# Patient Record
Sex: FEMALE | Race: WHITE | NOT HISPANIC OR LATINO | Employment: STUDENT | URBAN - METROPOLITAN AREA
[De-identification: names, ages, dates, MRNs, and addresses within clinical notes are randomized per-mention and may not be internally consistent; named-entity substitution may affect disease eponyms.]

---

## 2020-04-21 ENCOUNTER — HOSPITAL ENCOUNTER (OUTPATIENT)
Dept: RADIOLOGY | Facility: HOSPITAL | Age: 20
Discharge: HOME | End: 2020-04-21
Attending: INTERNAL MEDICINE
Payer: COMMERCIAL

## 2020-04-21 DIAGNOSIS — M79.606 PAIN OF LOWER EXTREMITY, UNSPECIFIED LATERALITY: ICD-10-CM

## 2020-04-21 DIAGNOSIS — M25.562 ACUTE PAIN OF BOTH KNEES: ICD-10-CM

## 2020-04-21 DIAGNOSIS — M25.561 ACUTE PAIN OF BOTH KNEES: ICD-10-CM

## 2020-04-30 ENCOUNTER — HOSPITAL ENCOUNTER (OUTPATIENT)
Dept: RADIOLOGY | Facility: HOSPITAL | Age: 20
Discharge: HOME | End: 2020-04-30
Attending: INTERNAL MEDICINE
Payer: COMMERCIAL

## 2020-04-30 DIAGNOSIS — M79.606 PAIN OF LOWER EXTREMITY, UNSPECIFIED LATERALITY: ICD-10-CM

## 2020-04-30 DIAGNOSIS — M25.561 ACUTE PAIN OF BOTH KNEES: ICD-10-CM

## 2020-04-30 DIAGNOSIS — M25.562 ACUTE PAIN OF BOTH KNEES: ICD-10-CM

## 2020-04-30 PROCEDURE — 93970 US LOWER EXTREMITY VEINS BILATERAL: ICD-10-PCS | Mod: 26,,, | Performed by: RADIOLOGY

## 2020-04-30 PROCEDURE — 93970 EXTREMITY STUDY: CPT | Mod: 26,,, | Performed by: RADIOLOGY

## 2020-04-30 PROCEDURE — 93970 EXTREMITY STUDY: CPT | Mod: TC

## 2020-05-27 DIAGNOSIS — M25.561 PAIN IN BOTH KNEES, UNSPECIFIED CHRONICITY: Primary | ICD-10-CM

## 2020-05-27 DIAGNOSIS — M25.562 PAIN IN BOTH KNEES, UNSPECIFIED CHRONICITY: Primary | ICD-10-CM

## 2020-06-02 ENCOUNTER — TELEPHONE (OUTPATIENT)
Dept: ORTHOPEDICS | Facility: CLINIC | Age: 20
End: 2020-06-02

## 2020-06-02 NOTE — TELEPHONE ENCOUNTER
Returned the call to the patient regarding a new appointment for the one that was missed on 06/02/2020. There was no answer at given number. A voicemail was left informing the patient that we have her scheduled for 06/11/2020 here in [place] at 10:45 for xray's and 11 for Dr. Ward. Advised that if this does not work for the patient that she can call [phone] to reschedule a more fitting time.

## 2020-06-02 NOTE — TELEPHONE ENCOUNTER
----- Message from Coretta Alarcon sent at 6/2/2020  9:48 AM CDT -----  Contact: self  Patient slept thru her appt for this morning and is very upset.  Wants to get it rescheduled to the earliest date available.  Since this was a referral along with xrays I didn't know how to reschedule it. Please call back at [phone].  Thanks

## 2020-06-16 ENCOUNTER — OFFICE VISIT (OUTPATIENT)
Dept: ORTHOPEDICS | Facility: CLINIC | Age: 20
End: 2020-06-16
Payer: COMMERCIAL

## 2020-06-16 ENCOUNTER — HOSPITAL ENCOUNTER (OUTPATIENT)
Dept: RADIOLOGY | Facility: HOSPITAL | Age: 20
Discharge: HOME | End: 2020-06-16
Attending: ORTHOPAEDIC SURGERY
Payer: COMMERCIAL

## 2020-06-16 VITALS — WEIGHT: 132.06 LBS | OXYGEN SATURATION: 98 % | HEART RATE: 75 BPM | BODY MASS INDEX: 26.62 KG/M2 | HEIGHT: 59 IN

## 2020-06-16 DIAGNOSIS — M25.562 PAIN IN BOTH KNEES, UNSPECIFIED CHRONICITY: Primary | ICD-10-CM

## 2020-06-16 DIAGNOSIS — M25.561 PAIN IN BOTH KNEES, UNSPECIFIED CHRONICITY: ICD-10-CM

## 2020-06-16 DIAGNOSIS — M23.8X1 ACL LAXITY, RIGHT: ICD-10-CM

## 2020-06-16 DIAGNOSIS — S83.241A TEAR OF MEDIAL MENISCUS OF RIGHT KNEE, CURRENT, UNSPECIFIED TEAR TYPE, INITIAL ENCOUNTER: ICD-10-CM

## 2020-06-16 DIAGNOSIS — M79.606 PAIN OF LOWER EXTREMITY, UNSPECIFIED LATERALITY: ICD-10-CM

## 2020-06-16 DIAGNOSIS — M25.562 PAIN IN BOTH KNEES, UNSPECIFIED CHRONICITY: ICD-10-CM

## 2020-06-16 DIAGNOSIS — M71.20 BAKER CYST, UNSPECIFIED LATERALITY: ICD-10-CM

## 2020-06-16 DIAGNOSIS — M23.8X2 ACL LAXITY, LEFT: ICD-10-CM

## 2020-06-16 DIAGNOSIS — S83.242A TEAR OF MEDIAL MENISCUS OF LEFT KNEE, CURRENT, UNSPECIFIED TEAR TYPE, INITIAL ENCOUNTER: Primary | ICD-10-CM

## 2020-06-16 DIAGNOSIS — M25.561 PAIN IN BOTH KNEES, UNSPECIFIED CHRONICITY: Primary | ICD-10-CM

## 2020-06-16 DIAGNOSIS — M25.561 ACUTE PAIN OF BOTH KNEES: ICD-10-CM

## 2020-06-16 DIAGNOSIS — M25.562 ACUTE PAIN OF BOTH KNEES: ICD-10-CM

## 2020-06-16 PROCEDURE — 99999 PR PBB SHADOW E&M-EST. PATIENT-LVL III: CPT | Mod: PBBFAC,,, | Performed by: ORTHOPAEDIC SURGERY

## 2020-06-16 PROCEDURE — 73562 X-RAY EXAM OF KNEE 3: CPT | Mod: TC,50,PN

## 2020-06-16 PROCEDURE — 73562 XR KNEE 3 VIEW BILATERAL: ICD-10-PCS | Mod: 26,RT,, | Performed by: RADIOLOGY

## 2020-06-16 PROCEDURE — 99203 PR OFFICE/OUTPT VISIT, NEW, LEVL III, 30-44 MIN: ICD-10-PCS | Mod: S$GLB,,, | Performed by: ORTHOPAEDIC SURGERY

## 2020-06-16 PROCEDURE — 3008F PR BODY MASS INDEX (BMI) DOCUMENTED: ICD-10-PCS | Mod: CPTII,S$GLB,, | Performed by: ORTHOPAEDIC SURGERY

## 2020-06-16 PROCEDURE — 3008F BODY MASS INDEX DOCD: CPT | Mod: CPTII,S$GLB,, | Performed by: ORTHOPAEDIC SURGERY

## 2020-06-16 PROCEDURE — 99203 OFFICE O/P NEW LOW 30 MIN: CPT | Mod: S$GLB,,, | Performed by: ORTHOPAEDIC SURGERY

## 2020-06-16 PROCEDURE — 99999 PR PBB SHADOW E&M-EST. PATIENT-LVL III: ICD-10-PCS | Mod: PBBFAC,,, | Performed by: ORTHOPAEDIC SURGERY

## 2020-06-16 PROCEDURE — 73562 X-RAY EXAM OF KNEE 3: CPT | Mod: 26,LT,, | Performed by: RADIOLOGY

## 2020-06-16 PROCEDURE — 73562 X-RAY EXAM OF KNEE 3: CPT | Mod: 26,RT,, | Performed by: RADIOLOGY

## 2020-06-16 NOTE — LETTER
June 16, 2020      Daniel Hewitt III, MD  [address]  [place] [address]           [facility]  [address] [place] [address] A  [facility] [place] [address]  Phone: [phone]  Fax: [phone]          Patient: Chloe Stoll   MR Number: [id number]   YOB: 2000   Date of Visit: 6/16/2020       Dear Dr. Daniel Hewitt III:    Thank you for referring Chloe Stoll to me for evaluation. Attached you will find relevant portions of my assessment and plan of care.    If you have questions, please do not hesitate to call me. I look forward to following Chloe Stoll along with you.    Sincerely,    Bandar Ward, DO    Enclosure  CC:  No Recipients    If you would like to receive this communication electronically, please contact [email or web address] or [phone] to request more information on [employer] access.    For providers and/or their staff who would like to refer a patient to [facility], please contact us through our one-stop-shop provider referral line, [facility], at [phone].    If you feel you have received this communication in error or would no longer like to receive these types of communications, please e-mail [email or web address]
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
## 2020-06-18 ENCOUNTER — CLINICAL SUPPORT (OUTPATIENT)
Dept: REHABILITATION | Facility: HOSPITAL | Age: 20
End: 2020-06-18
Attending: ORTHOPAEDIC SURGERY
Payer: COMMERCIAL

## 2020-06-18 DIAGNOSIS — M25.562 PAIN IN BOTH KNEES, UNSPECIFIED CHRONICITY: ICD-10-CM

## 2020-06-18 DIAGNOSIS — M25.561 PAIN IN BOTH KNEES, UNSPECIFIED CHRONICITY: ICD-10-CM

## 2020-06-18 PROCEDURE — 97162 PT EVAL MOD COMPLEX 30 MIN: CPT | Mod: PN

## 2020-06-18 PROCEDURE — 97110 THERAPEUTIC EXERCISES: CPT | Mod: PN
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
## 2020-06-19 NOTE — PROGRESS NOTES
Supervised face to face visit with Mik Guerrero PTA to discuss, current level of function, POC and progress toward goals.

## 2020-06-22 ENCOUNTER — CLINICAL SUPPORT (OUTPATIENT)
Dept: REHABILITATION | Facility: HOSPITAL | Age: 20
End: 2020-06-22
Attending: ORTHOPAEDIC SURGERY
Payer: COMMERCIAL

## 2020-06-22 DIAGNOSIS — M25.561 PAIN IN BOTH KNEES, UNSPECIFIED CHRONICITY: ICD-10-CM

## 2020-06-22 DIAGNOSIS — M25.562 PAIN IN BOTH KNEES, UNSPECIFIED CHRONICITY: ICD-10-CM

## 2020-06-22 PROCEDURE — 97110 THERAPEUTIC EXERCISES: CPT | Mod: PN

## 2020-06-22 PROCEDURE — 97140 MANUAL THERAPY 1/> REGIONS: CPT | Mod: PN

## 2020-06-24 PROBLEM — M25.561 KNEE PAIN, BILATERAL: Status: ACTIVE | Noted: 2020-06-24

## 2020-06-24 PROBLEM — M25.562 KNEE PAIN, BILATERAL: Status: ACTIVE | Noted: 2020-06-24

## 2020-06-24 NOTE — PROGRESS NOTES
"                            Physical Therapy Daily Treatment Note     Name: Chloe Stoll  Clinic Number: [id number]    Therapy Diagnosis: No diagnosis found.  Physician: Bandar Ward DO  Evaluation Date: 6/22/2020      Physician Orders: PT Eval and Treat   Medical Diagnosis from Referral: Pain in both knees, unspecified chronicity  - Primary   Evaluation Date: 6/18/2020  Authorization Period Expiration:   Plan of Care Expiration: 9/24/20  Visit # / Visits authorized: 1/ 12  Precautions:   Time In: 430    Time Out:515    Total Billable Time: 45 minutes    Treatments performed this session are highlighted in bold.  Any treatments below "NOT PERFORMED THIS SESSION" were not performed this session.    Subjective     Pt reports: no new complaints.  Response to previous treatment: previous session was evaluation  Functional change: TBA    Pain: 4/10  Location: r knee  Objective     Chloe received therapeutic exercises to develop strength, endurance, ROM, flexibility, posture and core stabilization for 30 minutes including:  Hip 4 way in supine jarrett le w manual cue for alighnment    Chloe received one or more of the following manual therapy techniques: Joint mobilizations, Manual traction, Myofacial release, Manual Lymphatic Drainage, Soft tissue Mobilization and Friction Massage for 5 minutes, including:patella mobs laterally; kinesio tape to r patella to strengthen VMO x 5 min     NOT PERFORMED THIS SESSION  Chloe participated in neuromuscular re-education activities to improve: Balance, Coordination, Kinesthetic, Sense, Proprioception and Posture for  minutes, including:    Chloe participated in dynamic functional therapeutic activities to improve functional performance for   minutes, including:      Chloe received the following direct contact modalities after being cleared for contraindications: Ultrasound:  Chloe received ultrasound to manage pain and inflammation at 100 % duty cycle applied to affect area " at an intensity of 1.5  W/cm2  for a duration of 10 minutes. Patient tolerated treatment well without adverse effects. Therapist was in attendance throughout intervention.    Chloe received the following supervised modalities after being cleared for contradictions:   IFC Electrical Stimulation:  Chloe received IFC Electrical Stimulation for pain control applied to the affected area. Pt received stimulation at 100 % scan at a frequency tolerable to patient for 15 minutes with MHP/CP.  Chloe tolderated treatment well without any adverse effects.      NMES Electrical Stimulation:  Chloe received NMES Electrical Stimulation to elicit muscle contraction of the quadriceps . Pt received stimulation at a rate tolerable to patient  using symmetric current, rwith 10 second on time and 10 second off time. MHP/CP applied simultaneously.  Patient tolerated treatment well without any adverse effects.     TENS:  Chloe received TENS electrical stimulation for pain to the affected area. Pt received continuous mode at a rate tolerable to patient for 15 minutes. Chloe tolerated treatment well without any adverse effects.     Mechanical Traction:  Chloe received static mechanical traction to the lumbar spine at a force of  pounds for a total of 20 minutes.  Patient tolerated treatment well without any adverse effects.    Home Exercises Provided and Patient Education Provided:  proper lifting technique, pain management, importance of positioning and posture, related secondary impairments and associated risks with diagnosis. Discussed safety and technique with transfers to include log roll, sit<>stand, gait training, use of DME, and associated fall risks.       Exercises were reviewed and Trupti able to demonstrate them prior to the end of the session.  Chloe demonstrated fair  understanding of the education provided.     See EMR under Media for exercises provided .      Assessment   Improvement in pain in R knee iin neg  stairs , L  Knee was painful.     Chloe is progressing well towards her goals.   Pt prognosis is Good.     Pt will continue to benefit from skilled outpatient physical therapy to address the deficits listed in the problem list box on initial evaluation, provide pt/family education and to maximize pt's level of independence in the home and community environment.     Pt's spiritual, cultural and educational needs considered and pt agreeable to plan of care and goals.    Anticipated barriers to physical therapy: none at this time    Goals: see eval    Plan   Continue with POC progressing toward established goals modifying treatments accordingly.     Pamela Martins, PT

## 2020-06-25 ENCOUNTER — CLINICAL SUPPORT (OUTPATIENT)
Dept: REHABILITATION | Facility: HOSPITAL | Age: 20
End: 2020-06-25
Attending: ORTHOPAEDIC SURGERY
Payer: COMMERCIAL

## 2020-06-25 DIAGNOSIS — M25.562 PAIN IN BOTH KNEES, UNSPECIFIED CHRONICITY: ICD-10-CM

## 2020-06-25 DIAGNOSIS — M25.561 PAIN IN BOTH KNEES, UNSPECIFIED CHRONICITY: ICD-10-CM

## 2020-06-29 PROCEDURE — 97110 THERAPEUTIC EXERCISES: CPT | Mod: PN

## 2020-06-29 PROCEDURE — 98960 EDU&TRN PT SELF-MGMT NQHP 1: CPT | Mod: PN

## 2020-06-29 NOTE — PROGRESS NOTES
"                            Physical Therapy Daily Treatment Note     Name: Chloe Stoll  Clinic Number: [id number]    Therapy Diagnosis:   Encounter Diagnosis   Name Primary?    Pain in both knees, unspecified chronicity      Physician: Bandar Ward DO  Evaluation Date: 6/25/2020      Physician Orders: PT Eval and Treat   Medical Diagnosis from Referral: Pain in both knees, unspecified chronicity  - Primary   Evaluation Date: 6/18/2020  Authorization Period Expiration:   Plan of Care Expiration: 9/24/20  Visit # / Visits authorized: 3/ 12  Precautions:   Time In: 430    Time Out:530   Total Billable Time: 45 minutes    Treatments performed this session are highlighted in bold.  Any treatments below "NOT PERFORMED THIS SESSION" were not performed this session.    Subjective     Pt reports: patient reports taping her knee at home  Response to previous treatment:positve response to taping  Functional change: TBA    Pain: 3/10  Location: r knee  Objective   Taping to r and l knee using lateral pull improving patella tracking    MES Electrical Stimulation:  Chloe received NMES Electrical Stimulation to elicit muscle contraction of the quadriceps . Pt received stimulation at a rate tolerable to patient  using symmetric current, rwith 10 second on time and 10 second off time. X 20 min  Patient tolerated treatment well without any adverse effects.     Chloe received therapeutic exercises to develop strength, endurance, ROM, flexibility, posture and core stabilization for 30 minutes including:  SLR' s 2 x 10  abd bracing  Bridging  Cycling x 20 min          NOT PERFORMED THIS SESSION  Chloe received one or more of the following manual therapy techniques: Joint mobilizations, Manual traction, Myofacial release, Manual Lymphatic Drainage, Soft tissue Mobilization and Friction Massage for 5 minutes, including:patella mobs laterally; kinesio tape to r patella to strengthen VMO x 5 min     NOT PERFORMED THIS " SESSION  Chloe participated in neuromuscular re-education activities to improve: Balance, Coordination, Kinesthetic, Sense, Proprioception and Posture for  minutes, including:    Chloe participated in dynamic functional therapeutic activities to improve functional performance for   minutes, including:      Chloe received the following direct contact modalities after being cleared for contraindications: Ultrasound:  Chloe received ultrasound to manage pain and inflammation at 100 % duty cycle applied to affect area at an intensity of 1.5  W/cm2  for a duration of 10 minutes. Patient tolerated treatment well without adverse effects. Therapist was in attendance throughout intervention.    Chloe received the following supervised modalities after being cleared for contradictions:   IFC Electrical Stimulation:  Chloe received IFC Electrical Stimulation for pain control applied to the affected area. Pt received stimulation at 100 % scan at a frequency tolerable to patient for 15 minutes with MHP/CP.  Chloe tolderated treatment well without any adverse effects.      NMES Electrical Stimulation:  Chloe received NMES Electrical Stimulation to elicit muscle contraction of the quadriceps . Pt received stimulation at a rate tolerable to patient  using symmetric current, rwith 10 second on time and 10 second off time. MHP/CP applied simultaneously.  Patient tolerated treatment well without any adverse effects.     TENS:  Chloe received TENS electrical stimulation for pain to the affected area. Pt received continuous mode at a rate tolerable to patient for 15 minutes. Chloe tolerated treatment well without any adverse effects.     Mechanical Traction:  Chloe received static mechanical traction to the lumbar spine at a force of  pounds for a total of 20 minutes.  Patient tolerated treatment well without any adverse effects.    Home Exercises Provided and Patient Education Provided:  proper lifting technique, pain  management, importance of positioning and posture, related secondary impairments and associated risks with diagnosis. Discussed safety and technique with transfers to include log roll, sit<>stand, gait training, use of DME, and associated fall risks.       Exercises were reviewed and Trupti able to demonstrate them prior to the end of the session.  Chloe demonstrated fair  understanding of the education provided.     See EMR under Media for exercises provided .      Assessment   Improvement in pain in R knee iin neg stairs , L  Knee was painful.     Chloe is progressing well towards her goals.   Pt prognosis is Good.     Pt will continue to benefit from skilled outpatient physical therapy to address the deficits listed in the problem list box on initial evaluation, provide pt/family education and to maximize pt's level of independence in the home and community environment.     Pt's spiritual, cultural and educational needs considered and pt agreeable to plan of care and goals.    Anticipated barriers to physical therapy: none at this time    Goals: see eval    Plan   Continue with POC progressing toward established goals modifying treatments accordingly.     Paemla Martins, PT

## 2020-06-30 ENCOUNTER — CLINICAL SUPPORT (OUTPATIENT)
Dept: REHABILITATION | Facility: HOSPITAL | Age: 20
End: 2020-06-30
Attending: ORTHOPAEDIC SURGERY
Payer: COMMERCIAL

## 2020-06-30 DIAGNOSIS — M25.561 PAIN IN BOTH KNEES, UNSPECIFIED CHRONICITY: ICD-10-CM

## 2020-06-30 DIAGNOSIS — M25.562 PAIN IN BOTH KNEES, UNSPECIFIED CHRONICITY: ICD-10-CM

## 2020-06-30 PROCEDURE — 97110 THERAPEUTIC EXERCISES: CPT | Mod: PN

## 2020-07-02 NOTE — PROGRESS NOTES
"                            Physical Therapy Daily Treatment Note     Name: Chloe Stoll  Clinic Number: [id number]    Therapy Diagnosis:   Encounter Diagnosis   Name Primary?    Pain in both knees, unspecified chronicity      Physician: Bandar Ward DO  Evaluation Date: 6/30/2020      Physician Orders: PT Eval and Treat   Medical Diagnosis from Referral: Pain in both knees, unspecified chronicity  - Primary   Evaluation Date: 6/18/2020  Authorization Period Expiration:   Plan of Care Expiration: 9/24/20  Visit # / Visits authorized: 3/ 12  Precautions:   Time In: 430    Time Out:530   Total Billable Time: 60 minutes    Treatments performed this session are highlighted in bold.  Any treatments below "NOT PERFORMED THIS SESSION" were not performed this session.    Subjective     Pt reports: patient reports taping her knee at home  Response to previous treatment:positve response to taping  Functional change: TBA    Pain: 2/10  Location: r knee  Objective   Taping to r and l knee using wrap around patella jarrett r and L      Chloe received therapeutic exercises to develop strength, endurance, ROM, flexibility, posture and core stabilization for 45 minutes including:  Lunges, wall squats, toe raises, heel cord stretch2 x 10    Walking lunges painful           NOT PERFORMED THIS SESSION  Chloe received one or more of the following manual therapy techniques: Joint mobilizations, Manual traction, Myofacial release, Manual Lymphatic Drainage, Soft tissue Mobilization and Friction Massage for 5 minutes, including:patella mobs laterally; kinesio tape to r patella to strengthen VMO x 5 min     NOT PERFORMED THIS SESSION  Chloe participated in neuromuscular re-education activities to improve: Balance, Coordination, Kinesthetic, Sense, Proprioception and Posture for  minutes, including:    Chloe participated in dynamic functional therapeutic activities to improve functional performance for   minutes, " including:      Chloe received the following direct contact modalities after being cleared for contraindications: Ultrasound:  Chloe received ultrasound to manage pain and inflammation at 100 % duty cycle applied to affect area at an intensity of 1.5  W/cm2  for a duration of 10 minutes. Patient tolerated treatment well without adverse effects. Therapist was in attendance throughout intervention.    Chloe received the following supervised modalities after being cleared for contradictions:   IFC Electrical Stimulation:  Chloe received IFC Electrical Stimulation for pain control applied to the affected area. Pt received stimulation at 100 % scan at a frequency tolerable to patient for 15 minutes with MHP/CP.  Chloe tolderated treatment well without any adverse effects.      NMES Electrical Stimulation:  Chloe received NMES Electrical Stimulation to elicit muscle contraction of the quadriceps . Pt received stimulation at a rate tolerable to patient  using symmetric current, rwith 10 second on time and 10 second off time. MHP/CP applied simultaneously.  Patient tolerated treatment well without any adverse effects.     TENS:  Chole received TENS electrical stimulation for pain to the affected area. Pt received continuous mode at a rate tolerable to patient for 15 minutes. Chloe tolerated treatment well without any adverse effects.     Mechanical Traction:  Chloe received static mechanical traction to the lumbar spine at a force of  pounds for a total of 20 minutes.  Patient tolerated treatment well without any adverse effects.    Home Exercises Provided and Patient Education Provided:  proper lifting technique, pain management, importance of positioning and posture, related secondary impairments and associated risks with diagnosis. Discussed safety and technique with transfers to include log roll, sit<>stand, gait training, use of DME, and associated fall risks.       Exercises were reviewed and Trupti lucero  to demonstrate them prior to the end of the session.  Chloe demonstrated fair  understanding of the education provided.     See EMR under Media for exercises provided .      Assessment   Patient put on her knee braces and then took them off . Pain come on after the were removed.      Chloe is progressing well towards her goals.   Pt prognosis is Good.     Pt will continue to benefit from skilled outpatient physical therapy to address the deficits listed in the problem list box on initial evaluation, provide pt/family education and to maximize pt's level of independence in the home and community environment.     Pt's spiritual, cultural and educational needs considered and pt agreeable to plan of care and goals.    Anticipated barriers to physical therapy: none at this time    Goals: see eval    Plan   Continue with POC progressing toward established goals modifying treatments accordingly.     Pamela Martins, PT

## 2020-07-30 ENCOUNTER — OFFICE VISIT (OUTPATIENT)
Dept: ORTHOPEDICS | Facility: CLINIC | Age: 20
End: 2020-07-30
Payer: COMMERCIAL

## 2020-07-30 VITALS
HEIGHT: 59 IN | OXYGEN SATURATION: 100 % | WEIGHT: 132.06 LBS | HEART RATE: 74 BPM | TEMPERATURE: 98 F | RESPIRATION RATE: 18 BRPM | BODY MASS INDEX: 26.62 KG/M2

## 2020-07-30 DIAGNOSIS — M25.562 ACUTE PAIN OF BOTH KNEES: ICD-10-CM

## 2020-07-30 DIAGNOSIS — S83.242A TEAR OF MEDIAL MENISCUS OF LEFT KNEE, CURRENT, UNSPECIFIED TEAR TYPE, INITIAL ENCOUNTER: Primary | ICD-10-CM

## 2020-07-30 DIAGNOSIS — M25.561 ACUTE PAIN OF BOTH KNEES: ICD-10-CM

## 2020-07-30 DIAGNOSIS — M23.8X1 ACL LAXITY, RIGHT: ICD-10-CM

## 2020-07-30 DIAGNOSIS — M71.20 BAKER CYST, UNSPECIFIED LATERALITY: ICD-10-CM

## 2020-07-30 DIAGNOSIS — S83.241A TEAR OF MEDIAL MENISCUS OF RIGHT KNEE, CURRENT, UNSPECIFIED TEAR TYPE, INITIAL ENCOUNTER: ICD-10-CM

## 2020-07-30 DIAGNOSIS — M23.8X2 ACL LAXITY, LEFT: ICD-10-CM

## 2020-07-30 PROCEDURE — 99213 OFFICE O/P EST LOW 20 MIN: CPT | Mod: S$GLB,,, | Performed by: ORTHOPAEDIC SURGERY

## 2020-07-30 PROCEDURE — 3008F PR BODY MASS INDEX (BMI) DOCUMENTED: ICD-10-PCS | Mod: CPTII,S$GLB,, | Performed by: ORTHOPAEDIC SURGERY

## 2020-07-30 PROCEDURE — 3008F BODY MASS INDEX DOCD: CPT | Mod: CPTII,S$GLB,, | Performed by: ORTHOPAEDIC SURGERY

## 2020-07-30 PROCEDURE — 99999 PR PBB SHADOW E&M-EST. PATIENT-LVL III: CPT | Mod: PBBFAC,,, | Performed by: ORTHOPAEDIC SURGERY

## 2020-07-30 PROCEDURE — 99999 PR PBB SHADOW E&M-EST. PATIENT-LVL III: ICD-10-PCS | Mod: PBBFAC,,, | Performed by: ORTHOPAEDIC SURGERY

## 2020-07-30 PROCEDURE — 99213 PR OFFICE/OUTPT VISIT, EST, LEVL III, 20-29 MIN: ICD-10-PCS | Mod: S$GLB,,, | Performed by: ORTHOPAEDIC SURGERY
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
